# Patient Record
Sex: FEMALE | Race: ASIAN | NOT HISPANIC OR LATINO | Employment: FULL TIME | URBAN - METROPOLITAN AREA
[De-identification: names, ages, dates, MRNs, and addresses within clinical notes are randomized per-mention and may not be internally consistent; named-entity substitution may affect disease eponyms.]

---

## 2018-01-10 ENCOUNTER — TELEPHONE (OUTPATIENT)
Dept: FAMILY MEDICINE | Facility: CLINIC | Age: 63
End: 2018-01-10

## 2018-01-10 NOTE — TELEPHONE ENCOUNTER
----- Message from Kristopher Hernandez sent at 1/10/2018  3:30 PM CST -----  The pt states she will need a [place] interpretor for their 1/11th appt at 11:20.  Please contact the [employer] at ext [phone] to obtain a JN.

## 2018-01-11 ENCOUNTER — OFFICE VISIT (OUTPATIENT)
Dept: FAMILY MEDICINE | Facility: CLINIC | Age: 63
End: 2018-01-11
Payer: COMMERCIAL

## 2018-01-11 VITALS
OXYGEN SATURATION: 98 % | DIASTOLIC BLOOD PRESSURE: 70 MMHG | RESPIRATION RATE: 16 BRPM | HEART RATE: 79 BPM | HEIGHT: 61 IN | SYSTOLIC BLOOD PRESSURE: 120 MMHG | BODY MASS INDEX: 23.39 KG/M2 | TEMPERATURE: 97 F | WEIGHT: 123.88 LBS

## 2018-01-11 DIAGNOSIS — R05.9 COUGH: Primary | ICD-10-CM

## 2018-01-11 PROCEDURE — 99201 PR OFFICE/OUTPT VISIT,NEW,LEVL I: CPT | Mod: 25,S$GLB,, | Performed by: FAMILY MEDICINE

## 2018-01-11 PROCEDURE — 96372 THER/PROPH/DIAG INJ SC/IM: CPT | Mod: S$GLB,,, | Performed by: FAMILY MEDICINE

## 2018-01-11 PROCEDURE — 99999 PR PBB SHADOW E&M-EST. PATIENT-LVL IV: CPT | Mod: PBBFAC,,, | Performed by: FAMILY MEDICINE

## 2018-01-11 RX ORDER — FLUTICASONE PROPIONATE 50 MCG
2 SPRAY, SUSPENSION (ML) NASAL DAILY
Qty: 16 G | Refills: 0 | Status: SHIPPED | OUTPATIENT
Start: 2018-01-11 | End: 2020-08-13 | Stop reason: SDUPTHER

## 2018-01-11 RX ORDER — AMOXICILLIN 500 MG
2 CAPSULE ORAL DAILY
COMMUNITY

## 2018-01-11 RX ORDER — BETAMETHASONE SODIUM PHOSPHATE AND BETAMETHASONE ACETATE 3; 3 MG/ML; MG/ML
12 INJECTION, SUSPENSION INTRA-ARTICULAR; INTRALESIONAL; INTRAMUSCULAR; SOFT TISSUE ONCE
Status: COMPLETED | OUTPATIENT
Start: 2018-01-11 | End: 2018-01-11

## 2018-01-11 RX ORDER — BENZONATATE 100 MG/1
100 CAPSULE ORAL 3 TIMES DAILY PRN
Qty: 30 CAPSULE | Refills: 0 | Status: SHIPPED | OUTPATIENT
Start: 2018-01-11 | End: 2022-02-07

## 2018-01-11 RX ADMIN — BETAMETHASONE SODIUM PHOSPHATE AND BETAMETHASONE ACETATE 12 MG: 3; 3 INJECTION, SUSPENSION INTRA-ARTICULAR; INTRALESIONAL; INTRAMUSCULAR; SOFT TISSUE at 11:01

## 2018-01-11 NOTE — PATIENT INSTRUCTIONS
"  Ho, mãn tính, nguyên nhân không ch?c ch?n (ng??i l?n)    M?i ng??i ??u t?ng b? ho có th? khi m?c c?m l?nh, cúm ho?c viêm ph? qu?n (bronchitis). Ho ki?u này x?y ra cùng v?i c?m giác ?au, s?t nh?, s?t s?t và ngh?t m?i, c? h?ng ng?a ho?c ?au. Tình tr?ng s? khá h?n david rocío?ng carmen t?i ba tu?n. Ho patsy d?ng lâu h?n ba tu?n có th? th??ng th??ng là do các nguyên nhân khác.  Brendon k?t qu? khám hôm nay c?a quý v? thì nguyên nhân chính xác d?n ??n ch?ng ho c?a quý v? là ch?a ch?c ch?n. D??i ?ây là m?t s? nguyên nhân gây ra ho patsy d?ng th??ng g?p. N?u ho không ?? david carmen tu?n ti?p brendon, có th? ph?i ti?n hành ki?m tra thêm. Tuân th? l?ch h?n v?i bác s? c?a quý v? brendon ch? d?n.  Ho ? ng??i hút thu?c  "Ho ? ng??i hút thu?c" không kh?i ???c. N?u quý v? ti?p t?c hút thu?c, nó s? ch? t? h?n mà thôi. Ho phát sinh do b? kích ?ng ? khí ??o. Hãy nói mary ann?n v?i bác s? c?a quý v? v? vi?c b? thu?c. Thu?c larios nicotine, k?o larios alejandra, máy hít xông, thu?c s?t m?i ho?c ph??ng pháp khác có th? giúp ho d?u h?n.  Nh? gi?t david m?i (post-nasdal drip)  Ho có tri?u ch?ng n?ng h?n vào bu?i t?i có th? là do h?i ch?ng nh? gi?t david m?i. D?ch nhày carey m?i ti?t ra nusrat?u h?n t? phía david m?i c?a quý v? vào c? h?ng và kích thích ph?n ?ng ho. Nguyên nhân có th? là do nusrat?m trùng xoang ho?c d? ?ng. Các tác nhân gây d? ?ng th??ng g?p g?m có: b?i, khói, m?c ph?n nehemiah, thú nuôi, ch?t t?y r?a, thu?c kh? mùi phòng và mùi h??ng hóa h?c. Thu?c kháng histamine/thu?c làm thông m?i bán zhao qu?y có th? có tác d?ng ch?a d? ?ng. Nusrat?m trùng m?i c?n ???c ?i?u tr? b?ng thu?c kháng sinh. Hãy t?i g?p bác s? c?a quý v? n?u các tri?u ch?ng v?n ti?p t?c.  Thu?c  M?t s? lo?i thu?c kê ??n có th? gây ho mãn tính ? m?t s? ng??i:  · Thu?c ?c ch? ACE tr? areli?t áp larios. Các lo?i thu?c này lazarus g?m Lotensin (benazepril), Capoten (captopril), Vasotec (enalapril), Monopril (fosinopril), Prinivil và Zestril (lisinopril), Accupril (quinapril), Altace (ramipril) và các lo?i thu?c khác.  · Thu?c ?c " ch? Beta tr? areli?t áp larios và các tình tr?ng khác. Các lo?i thu?c này lazarus g?m Inderal (propranolol), Tenormin (atenolol), Lopressor (metoprolol), Corgard (nadolol) và các lo?i thu?c khác.  Hãy cho bác s? c?a quý v? bi?t n?u quý v? ?ang dùng b?t k? lo?i thu?c nào carey s? này.  Hen saray?n (asthma)  Ho có th? là d?u hi?u debbie nh?t cho th?y ch?ng hen saray?n nh?. Bác s? c?a quý v? có th? ti?n hành ki?m tra ph?i ?? xem xem li?u ?ây có ph?i là nguyên nhân hay không. Ph?n ?ng c?a quý v? tr??c vi?c th? thu?c tr? hen saray?n c?ng có th? giúp ti?n hành ch?n ?oán.  H?i l?u axit (Ch?ng ? nóng (heartburn))   Th?c qu?n là ?ng d?n th?c ?n t? mi?ng t?i d? dày. Có m?t van ? ??u d??i c?a th?c qu?n ng?n axit d? dày trào ng??c lên. N?u van này không v?n hành ?úng ch?c n?ng, axit t? d? dày s? ?i vào th?c qu?n. ?i?u này có th? gây ?au b?ng rát ? ph?n trên b?ng ho?c d??i ng?c, ? nóng ho?c ho. Các tri?u ch?ng th??ng t? h?n khi n?m th?ng. Tránh ?n u?ng tr??c gi? ?i ng?. Th? dùng thêm g?i ?? nâng ph?n thân trên c?a quý v? lên ho?c ??t m?t v?t dày rocío?ng 4 inch d??i ??u gi??ng c?a quý v?. Quý v? có th? th? m?t lo?i thu?c ch?ng axit bán zhao qu?y (Tums ho?c Mylanta) ho?c thu?c ch?n axit (Pepcid AC, Tagamet HB, Zantac 75 ho?c Prilosec OTC). Các lo?i thu?c m?nh tr? ch?ng này có th? ???c bác s? c?a quý v? kê ??n.  Brendon dõi  cùng bác s? c?a quý v? nh? ???c ch? d?n n?u c?n ho c?a quý v? không thuyên gi?m david 2 tu?n ti?p brendon. Có th? ph?i c?n ki?m tra thêm.  [L?U Ý: N?u có ch?p X-pat m?t chuyên anabel khác s? xem phim ch?p. Quý v? s? ???c thông báo v? m?i phát hi?n m?i mà có th? ?nh h??ng t?i vi?c ch?m sóc quý v?.]  Tìm s? timmy tâm y t? ngay l?p t?c  n?u b?t k? ?i?u nào david ?ây x?y ra:  · Th? khò khè ho?c khó th?  · S?t 100,4ºF (38ºC) ho?c larios h?n ho?c brendon ch? d?n c?a nhân viên y t?  · Gi?m cân không cecy mu?n  · Ho ra l??ng l?n ??m có màu  · Ho ra máu  · Toát m? hôi v? ?êm (làm ??t ?ãm ga tr?i gi??ng ho?c josias)  Date Last Reviewed: 9/13/2015  ©  0207-1157 [employer]. [address] [place] [address], [place] [address]. All rights reserved. This information is not intended as a substitute for professional medical care. Always follow your healthcare professional's instructions.

## 2018-01-11 NOTE — PROGRESS NOTES
Subjective:       Patient ID: Christa Lindo is a 62 y.o. female.    Chief Complaint: URI      HPI   Ms. Lindo presents to clinic today for complaints of upper respiratory infection.   She states she has had a dry cough for a few weeks.   She also has sneezing and sore throat.   She has some low back pain, that she does not take anything for.   She denies any fever.     [facility]   She has not had a pap in a long time.   She had a colonoscopy in [place].     Review of Systems   Constitutional: Negative for fever.   HENT: Positive for sneezing and sore throat. Negative for rhinorrhea.    Respiratory: Positive for cough.    Cardiovascular: Negative for chest pain.   Gastrointestinal: Negative for abdominal pain and vomiting.   Musculoskeletal: Positive for back pain.   Neurological: Negative for headaches.           There is no problem list on file for this patient.        Objective:     Physical Exam   Constitutional: She is oriented to person, place, and time. She appears well-developed and well-nourished. No distress.   HENT:   Head: Normocephalic and atraumatic.   Right Ear: External ear normal.   Left Ear: External ear normal.   Mouth/Throat: No oropharyngeal exudate.   Eyes: EOM are normal. Right eye exhibits no discharge. Left eye exhibits no discharge.   Cardiovascular: Normal rate and regular rhythm.    Pulmonary/Chest: Effort normal and breath sounds normal. No respiratory distress. She has no wheezes.   Musculoskeletal: She exhibits no edema.   Neurological: She is alert and oriented to person, place, and time.   Skin: Skin is warm and dry. She is not diaphoretic. No erythema.   Psychiatric: She has a normal mood and affect.   Vitals reviewed.    Vitals:    01/11/18 1112   BP: 120/70   Pulse: 79   Resp: 16   Temp: 97.4 °F (36.3 °C)       Assessment/  PLAN     Cough  -     betamethasone acetate-betamethasone sodium phosphate injection 12 mg; Inject 2 mLs (12 mg total)  into the muscle once.  -     benzonatate (TESSALON) 100 MG capsule; Take 1 capsule (100 mg total) by mouth 3 (three) times daily as needed for Cough.  Dispense: 30 capsule; Refill: 0  -     fluticasone (FLONASE) 50 mcg/actuation nasal spray; 2 sprays (100 mcg total) by Each Nare route once daily.  Dispense: 16 g; Refill: 0      Plan as above   rtc prn  Advised pt to come in for annual physical       Peter Ortega MD  [facility]

## 2018-05-17 ENCOUNTER — PATIENT OUTREACH (OUTPATIENT)
Dept: ADMINISTRATIVE | Facility: HOSPITAL | Age: 63
End: 2018-05-17

## 2018-06-15 DIAGNOSIS — Z12.39 BREAST CANCER SCREENING: ICD-10-CM

## 2020-06-15 DIAGNOSIS — R05.9 COUGH: Primary | ICD-10-CM

## 2020-08-13 ENCOUNTER — HOSPITAL ENCOUNTER (OUTPATIENT)
Dept: RADIOLOGY | Facility: HOSPITAL | Age: 65
Discharge: HOME | End: 2020-08-13
Attending: INTERNAL MEDICINE
Payer: MEDICARE

## 2020-08-13 ENCOUNTER — OFFICE VISIT (OUTPATIENT)
Dept: PULMONOLOGY | Facility: CLINIC | Age: 65
End: 2020-08-13
Payer: MEDICARE

## 2020-08-13 VITALS
HEART RATE: 60 BPM | HEIGHT: 61 IN | RESPIRATION RATE: 16 BRPM | SYSTOLIC BLOOD PRESSURE: 112 MMHG | OXYGEN SATURATION: 99 % | BODY MASS INDEX: 20.44 KG/M2 | DIASTOLIC BLOOD PRESSURE: 60 MMHG | WEIGHT: 108.25 LBS

## 2020-08-13 DIAGNOSIS — J84.9 INTERSTITIAL PULMONARY DISEASE, UNSPECIFIED: ICD-10-CM

## 2020-08-13 DIAGNOSIS — R05.9 COUGH: ICD-10-CM

## 2020-08-13 DIAGNOSIS — J30.89 NON-SEASONAL ALLERGIC RHINITIS DUE TO OTHER ALLERGIC TRIGGER: Primary | ICD-10-CM

## 2020-08-13 PROCEDURE — 99999 PR PBB SHADOW E&M-EST. PATIENT-LVL IV: CPT | Mod: PBBFAC,,, | Performed by: INTERNAL MEDICINE

## 2020-08-13 PROCEDURE — 71046 X-RAY EXAM CHEST 2 VIEWS: CPT | Mod: TC

## 2020-08-13 PROCEDURE — 99999 PR PBB SHADOW E&M-EST. PATIENT-LVL IV: ICD-10-PCS | Mod: PBBFAC,,, | Performed by: INTERNAL MEDICINE

## 2020-08-13 PROCEDURE — 99214 OFFICE O/P EST MOD 30 MIN: CPT | Mod: PBBFAC,25 | Performed by: INTERNAL MEDICINE

## 2020-08-13 PROCEDURE — 71046 XR CHEST PA AND LATERAL: ICD-10-PCS | Mod: 26,,, | Performed by: RADIOLOGY

## 2020-08-13 PROCEDURE — 71046 X-RAY EXAM CHEST 2 VIEWS: CPT | Mod: 26,,, | Performed by: RADIOLOGY

## 2020-08-13 PROCEDURE — 99205 OFFICE O/P NEW HI 60 MIN: CPT | Mod: S$PBB,,, | Performed by: INTERNAL MEDICINE

## 2020-08-13 PROCEDURE — 99205 PR OFFICE/OUTPT VISIT, NEW, LEVL V, 60-74 MIN: ICD-10-PCS | Mod: S$PBB,,, | Performed by: INTERNAL MEDICINE

## 2020-08-13 RX ORDER — CEPHALEXIN 500 MG/1
CAPSULE ORAL
COMMUNITY
Start: 2020-08-04

## 2020-08-13 RX ORDER — CETIRIZINE HYDROCHLORIDE 10 MG/1
10 TABLET ORAL DAILY
Qty: 30 TABLET | Refills: 11 | Status: SHIPPED | OUTPATIENT
Start: 2020-08-13 | End: 2021-09-16

## 2020-08-13 RX ORDER — ONDANSETRON 4 MG/1
TABLET, ORALLY DISINTEGRATING ORAL
COMMUNITY
Start: 2020-08-04

## 2020-08-13 RX ORDER — HYDROCODONE BITARTRATE AND ACETAMINOPHEN 7.5; 325 MG/1; MG/1
1 TABLET ORAL EVERY 6 HOURS PRN
COMMUNITY
Start: 2020-08-04

## 2020-08-13 RX ORDER — FLUTICASONE PROPIONATE 50 MCG
2 SPRAY, SUSPENSION (ML) NASAL DAILY
Qty: 16 G | Refills: 11 | Status: SHIPPED | OUTPATIENT
Start: 2020-08-13 | End: 2021-06-15

## 2020-08-13 NOTE — PATIENT INSTRUCTIONS
SinuCleanse® Neti Pot Nasal Wash System  Use this Neti Pot to naturally relieve symptoms from nasal congestion, sinusitis, rhinitis, sinus headaches, cold and flu, runny nose and allergies. Neti Pot uses gravity to flush your nasal passages with pharmaceutical grade SinuCleanse saline.     SinuCleanse® Neti Pot Nasal Wash System  Use this Neti Pot to naturally relieve symptoms from nasal congestion, sinusitis, rhinitis, sinus headaches, cold and flu, runny nose and allergies. Neti Pot uses gravity to flush your nasal passages with pharmaceutical grade SinuCleanse saline.     Please read Warning before using.    USE ONLY AS DIRECTED, IF SYMPTOMS PERSIST SEE YOUR DOCTOR/HEALTHCARE PROFESSIONAL. ALWAYS READ THE LABEL. IMPORTANT: NeilMed® SINUS RINSE Mixture Packets should be used with NeilMed® 240 mL (8 fl oz) NASAFLO® to achieve the best results. You may use NeilMed® packets with other irrigation devices, as long as you mix with the correct volume of water. Our recommendation is to replace Neti Pot every three months.  Step 1  Please wash your hands and rinse the device. Fill the NASAFLO® with 240 mL (8 fl oz) of lukewarm distilled, filtered or previously boiled water. Please do not use tap or faucet water to dissolve the mixture unless it has been previously boiled and cooled down. You may warm the water in a microwave, but we recommend that you warm it in increments of 5 to 10 seconds to avoid overheating, damaging the device or scalding your nasal passage. Step 2  Cut the SINUS RINSE mixture packet at the corner and pour contents into the pot. Tighten the lid on the device securely. Place one finger over the hole of the cap and shake the device gently to dissolve the mixture. Step 3  Standing in front of a sink, bend forward to your comfort level and tilt your head to one side. Keeping your mouth open, and without holding your breath, apply the tip of the device snugly against your nasal passage and ALLOW THE  SOLUTION TO GENTLY FLOW until the solution starts draining from the opposite nasal passage. Use roughly half the solution in the NASAFLO® (120 mL / 4 fl oz).  It should not enter your mouth unless you are tilting your head backwards. To adjust or stop the flow, you may place your finger over the hole of the cap, and depending on the seal, you may be able to control the flow. Step 4  Blow your nose very gently, without pinching nose completely to avoid pressure on eardrums. If tolerable, sniff in gently any residual solution remaining in the nasal passage once or twice, because this may clean out the posterior nasopharyngeal area, which is the area at the back of your nasal passage. At times, some solution will reach the back of your throat, so please spit it out.  For NASAFLO® users, to help drain any residual solution, blow your nose gently while tilting your head forward and to the same side of the nasal passage you just rinsed. Step 5  Now repeat steps 3 & 4 on your other nasal passage.  If there is any solution left over, please discard it. We recommend you make a fresh solution each time you rinse. Rinse once or twice daily OR as directed by your physician. Saline is considered safe.  The U.S. FDA is recommending that common cough and cold over the counter medicines not be given to babies and toddlers, and that antihistamines should not be given to children under age 6. NeilMed® NASAFLO®: Please clean with soap & water and let air dry Please read Warning before using.    Our recommendation is to replace Neti Pot every three months.

## 2020-08-13 NOTE — LETTER
August 13, 2020      Aaareferral Self           [facility]  [address]  [place] [address]  Phone: [phone]  Fax: [phone]          Patient: Christa Lindo   MR Number: [id number]   YOB: 1955   Date of Visit: 8/13/2020       Dear Aaarefsimonal Self:    Thank you for referring Christa Lindo to me for evaluation. Attached you will find relevant portions of my assessment and plan of care.    If you have questions, please do not hesitate to call me. I look forward to following Christa Lindo along with you.    Sincerely,    Anders Cortez MD    Enclosure  CC:  No Recipients    IIf you would like to receive this communication electronically, please contact [email or web address] or [phone] to request [employer] access.    [employer] is a tool which provides read-only access to select patient information with whom you have a relationship. Its easy to use and provides real time access to review your patients record including encounter summaries, notes, results, and demographic information.    If you feel you have received this communication in error or would no longer like to receive these types of communications, please e-mail [email or web address]

## 2020-08-13 NOTE — PROGRESS NOTES
Subjective:     Patient ID: Christa Lindo is a 65 y.o. female.    Chief Complaint:      HPI 65-year-old [place] female with a recent diagnosis of breast cancer is accompanied by her son in the office today who provided translation.  The patient is one-week post op mastectomy on the right side and still has some bandages on her chest.  Her son informs me that a PET scan was performed prior to her surgery outside of the [facility] system.  I have asked him to obtain a copy of this scan on disc in bring it with him into the office on the follow-up visit.    Cough  Patient complains of nasal congestion and nonproductive cough. Symptoms began 1 year ago. Symptoms have been unchanged since that time.The cough is dry and is aggravated by  spicy food. Associated symptoms include: postnasal drip. Patient does not have new pets. Patient does not have a history of asthma. Patient does not have a history of environmental allergens. Patient has not traveled recently. Patient does not have a history of smoking. Patient has had a previous chest x-ray. Patient has had a PPD done.  Had the BCG vaccine when she was in [facility].  Given medicine for TB upon arrival in [place]  Hx of mastectomy on the right - last week  Father with TB    Past Medical History:   Diagnosis Date    Breast cancer, right breast 08/13/2020     Past Surgical History:   Procedure Laterality Date    MASTECTOMY Right      Review of patient's allergies indicates:  No Known Allergies  Current Outpatient Medications on File Prior to Visit   Medication Sig Dispense Refill    cephALEXin (KEFLEX) 500 MG capsule TAKE 1 CAPSULE BY MOUTH TWICE A DAY FOR 14 DAYS      HYDROcodone-acetaminophen (NORCO) 7.5-325 mg per tablet Take 1 tablet by mouth every 6 (six) hours as needed.      ondansetron (ZOFRAN-ODT) 4 MG TbDL DISSOLVE ONE TABLET IN MOUTH EVERY 6 HOURS AS NEEDED FOR NAUSEA      benzonatate (TESSALON) 100 MG capsule Take 1 capsule (100 mg total) by mouth 3  "(three) times daily as needed for Cough. (Patient not taking: Reported on 8/13/2020) 30 capsule 0    fish oil-omega-3 fatty acids 300-1,000 mg capsule Take 2 g by mouth once daily.      [DISCONTINUED] fluticasone (FLONASE) 50 mcg/actuation nasal spray 2 sprays (100 mcg total) by Each Nare route once daily. (Patient not taking: Reported on 8/13/2020) 16 g 0     No current facility-administered medications on file prior to visit.      Social History     Socioeconomic History    Marital status: Single     Spouse name: Not on file    Number of children: Not on file    Years of education: Not on file    Highest education level: Not on file   Occupational History    Not on file   Social Needs    Financial resource strain: Not on file    Food insecurity     Worry: Not on file     Inability: Not on file    Transportation needs     Medical: Not on file     Non-medical: Not on file   Tobacco Use    Smoking status: Never Smoker    Smokeless tobacco: Never Used   Substance and Sexual Activity    Alcohol use: Not Currently    Drug use: Never    Sexual activity: Not Currently   Lifestyle    Physical activity     Days per week: Not on file     Minutes per session: Not on file    Stress: Not on file   Relationships    Social connections     Talks on phone: Not on file     Gets together: Not on file     Attends Anabaptism service: Not on file     Active member of club or organization: Not on file     Attends meetings of clubs or organizations: Not on file     Relationship status: Not on file   Other Topics Concern    Not on file   Social History Narrative    Not on file     No family history on file.    Review of Systems   HENT: Positive for postnasal drip.    Respiratory: Positive for cough and shortness of breath.        Objective:      /60   Pulse 60   Resp 16   Ht 5' 1" (1.549 m)   Wt 49.1 kg (108 lb 3.9 oz)   SpO2 99%   BMI 20.45 kg/m²   Physical Exam  Vitals signs and nursing note reviewed. "   Constitutional:       Appearance: She is well-developed.   HENT:      Head: Normocephalic and atraumatic.      Nose: Congestion and rhinorrhea present.   Eyes:      Conjunctiva/sclera: Conjunctivae normal.      Pupils: Pupils are equal, round, and reactive to light.   Neck:      Musculoskeletal: Neck supple.      Thyroid: No thyromegaly.      Vascular: No JVD.      Trachea: No tracheal deviation.   Cardiovascular:      Rate and Rhythm: Normal rate and regular rhythm.      Heart sounds: Normal heart sounds.   Pulmonary:      Effort: Pulmonary effort is normal. No respiratory distress.      Breath sounds: Normal breath sounds. No wheezing or rales.   Chest:      Chest wall: No tenderness.       Abdominal:      General: Bowel sounds are normal.      Palpations: Abdomen is soft.   Musculoskeletal: Normal range of motion.   Lymphadenopathy:      Cervical: No cervical adenopathy.   Skin:     General: Skin is warm and dry.   Neurological:      Mental Status: She is alert and oriented to person, place, and time.       Personal Diagnostic Review  Chest x-ray:  There is evidence of a right mastectomy.  Postop material overlies the right chest.  Slight increase in interstitial markings in the left lower lung field.  X-Ray Chest PA And Lateral  Narrative: EXAMINATION:  XR CHEST PA AND LATERAL    CLINICAL HISTORY:  Cough    TECHNIQUE:  PA and lateral views of the chest were performed.    COMPARISON:  None    FINDINGS:  Cardiac silhouette and mediastinal contours are normal.  Lungs demonstrate left greater than right basilar haziness.  No pleural effusion.  Osseous structures are intact.  Impression: Bibasilar haziness, correlate for non-specific pneumonitis.    Electronically signed by: Juan Ernandez MD  Date:    08/13/2020  Time:    09:43      Office Spirometry Results:     No flowsheet data found.  Pulmonary Studies Review 8/13/2020   SpO2 99   Height 61   Weight 1731.93   BMI (Calculated) 20.5   Predicted Distance  359.47   Predicted Distance Meters (Calculated) 494.14         Assessment:            Non-seasonal allergic rhinitis due to other allergic trigger  -     fluticasone propionate (FLONASE) 50 mcg/actuation nasal spray; 2 sprays (100 mcg total) by Each Nostril route once daily.  Dispense: 16 g; Refill: 11  -     cetirizine (ZYRTEC) 10 MG tablet; Take 1 tablet (10 mg total) by mouth once daily. For sinus congestion  Dispense: 30 tablet; Refill: 11    Cough  -     CT Chest Without Contrast; Future; Expected date: 08/13/2020  -     fluticasone propionate (FLONASE) 50 mcg/actuation nasal spray; 2 sprays (100 mcg total) by Each Nostril route once daily.  Dispense: 16 g; Refill: 11  -     cetirizine (ZYRTEC) 10 MG tablet; Take 1 tablet (10 mg total) by mouth once daily. For sinus congestion  Dispense: 30 tablet; Refill: 11    Interstitial pulmonary disease, unspecified  -     CT Chest Without Contrast; Future; Expected date: 08/13/2020          Outpatient Encounter Medications as of 8/13/2020   Medication Sig Dispense Refill    cephALEXin (KEFLEX) 500 MG capsule TAKE 1 CAPSULE BY MOUTH TWICE A DAY FOR 14 DAYS      HYDROcodone-acetaminophen (NORCO) 7.5-325 mg per tablet Take 1 tablet by mouth every 6 (six) hours as needed.      ondansetron (ZOFRAN-ODT) 4 MG TbDL DISSOLVE ONE TABLET IN MOUTH EVERY 6 HOURS AS NEEDED FOR NAUSEA      benzonatate (TESSALON) 100 MG capsule Take 1 capsule (100 mg total) by mouth 3 (three) times daily as needed for Cough. (Patient not taking: Reported on 8/13/2020) 30 capsule 0    cetirizine (ZYRTEC) 10 MG tablet Take 1 tablet (10 mg total) by mouth once daily. For sinus congestion 30 tablet 11    fish oil-omega-3 fatty acids 300-1,000 mg capsule Take 2 g by mouth once daily.      fluticasone propionate (FLONASE) 50 mcg/actuation nasal spray 2 sprays (100 mcg total) by Each Nostril route once daily. 16 g 11    [DISCONTINUED] fluticasone (FLONASE) 50 mcg/actuation nasal spray 2 sprays (100 mcg  total) by Each Nare route once daily. (Patient not taking: Reported on 2020) 16 g 0     No facility-administered encounter medications on file as of 2020.      Plan:       Requested Prescriptions     Signed Prescriptions Disp Refills    fluticasone propionate (FLONASE) 50 mcg/actuation nasal spray 16 g 11     Si sprays (100 mcg total) by Each Nostril route once daily.    cetirizine (ZYRTEC) 10 MG tablet 30 tablet 11     Sig: Take 1 tablet (10 mg total) by mouth once daily. For sinus congestion     Problem List Items Addressed This Visit     None      Visit Diagnoses     Non-seasonal allergic rhinitis due to other allergic trigger    -  Primary    Relevant Medications    fluticasone propionate (FLONASE) 50 mcg/actuation nasal spray    cetirizine (ZYRTEC) 10 MG tablet    Cough        Relevant Medications    fluticasone propionate (FLONASE) 50 mcg/actuation nasal spray    cetirizine (ZYRTEC) 10 MG tablet    Other Relevant Orders    CT Chest Without Contrast    Interstitial pulmonary disease, unspecified        Relevant Orders    CT Chest Without Contrast             Follow up in about 4 weeks (around 9/10/2020) for Review CT/PET - on return visit.    MEDICAL DECISION MAKING: Moderate to high complexity.  Overall, the multiple problems listed are of moderate to high severity that may impact quality of life and activities of daily living. Side effects of medications, treatment plan as well as options and alternatives reviewed and discussed with patient. There was counseling of patient concerning these issues.    Total time spent in face to face counseling and coordination of care - 60  minutes over 50% of time was used in discussion of prognosis, risks, benefits of treatment, instructions and compliance with regimen . Discussion with other physicians or health care providers (DME, NP, pharmacy, respiratory therapy) occurred.

## 2020-09-17 ENCOUNTER — HOSPITAL ENCOUNTER (OUTPATIENT)
Dept: RADIOLOGY | Facility: HOSPITAL | Age: 65
Discharge: HOME | End: 2020-09-17
Attending: INTERNAL MEDICINE
Payer: MEDICARE

## 2020-09-17 ENCOUNTER — OFFICE VISIT (OUTPATIENT)
Dept: PULMONOLOGY | Facility: CLINIC | Age: 65
End: 2020-09-17
Payer: MEDICARE

## 2020-09-17 VITALS
BODY MASS INDEX: 19.94 KG/M2 | RESPIRATION RATE: 16 BRPM | SYSTOLIC BLOOD PRESSURE: 110 MMHG | OXYGEN SATURATION: 100 % | HEART RATE: 62 BPM | HEIGHT: 61 IN | WEIGHT: 105.63 LBS | DIASTOLIC BLOOD PRESSURE: 70 MMHG

## 2020-09-17 DIAGNOSIS — J45.991 COUGH VARIANT ASTHMA: Primary | ICD-10-CM

## 2020-09-17 DIAGNOSIS — R05.9 COUGH: ICD-10-CM

## 2020-09-17 DIAGNOSIS — U07.1 COVID-19: Primary | ICD-10-CM

## 2020-09-17 DIAGNOSIS — J84.9 INTERSTITIAL PULMONARY DISEASE, UNSPECIFIED: ICD-10-CM

## 2020-09-17 PROCEDURE — 99213 OFFICE O/P EST LOW 20 MIN: CPT | Mod: PBBFAC,25 | Performed by: INTERNAL MEDICINE

## 2020-09-17 PROCEDURE — 99999 PR PBB SHADOW E&M-EST. PATIENT-LVL III: ICD-10-PCS | Mod: PBBFAC,,, | Performed by: INTERNAL MEDICINE

## 2020-09-17 PROCEDURE — 99214 OFFICE O/P EST MOD 30 MIN: CPT | Mod: S$PBB,,, | Performed by: INTERNAL MEDICINE

## 2020-09-17 PROCEDURE — 99999 PR PBB SHADOW E&M-EST. PATIENT-LVL III: CPT | Mod: PBBFAC,,, | Performed by: INTERNAL MEDICINE

## 2020-09-17 PROCEDURE — 71250 CT THORAX DX C-: CPT | Mod: TC

## 2020-09-17 PROCEDURE — 71250 CT THORAX DX C-: CPT | Mod: 26,,, | Performed by: RADIOLOGY

## 2020-09-17 PROCEDURE — 99214 PR OFFICE/OUTPT VISIT, EST, LEVL IV, 30-39 MIN: ICD-10-PCS | Mod: S$PBB,,, | Performed by: INTERNAL MEDICINE

## 2020-09-17 PROCEDURE — 71250 CT CHEST WITHOUT CONTRAST: ICD-10-PCS | Mod: 26,,, | Performed by: RADIOLOGY

## 2020-09-17 RX ORDER — PREDNISONE 20 MG/1
TABLET ORAL
Qty: 20 TABLET | Refills: 0 | Status: SHIPPED | OUTPATIENT
Start: 2020-09-17 | End: 2022-02-07

## 2020-09-17 RX ORDER — FLUTICASONE PROPIONATE AND SALMETEROL XINAFOATE 115; 21 UG/1; UG/1
2 AEROSOL, METERED RESPIRATORY (INHALATION) 2 TIMES DAILY
Qty: 1 INHALER | Refills: 11 | Status: SHIPPED | OUTPATIENT
Start: 2020-09-17 | End: 2021-07-07 | Stop reason: SDUPTHER

## 2020-09-17 NOTE — PROGRESS NOTES
Subjective:     Patient ID: Christa Lindo is a 65 y.o. female.    Chief Complaint:    Interviewed through  - son  Cough  Associated symptoms include postnasal drip and shortness of breath.    65-year-old [place] female with a recent diagnosis of breast cancer is accompanied by her son in the office today who provided translation.  The patient is one-week post op mastectomy on the right side and still has some bandages on her chest.  Her son informs me that a PET scan was performed prior to her surgery outside of the [facility] system.  I have asked him to obtain a copy of this scan on disc in bring it with him into the office on the follow-up visit.    Cough  Patient complains of nasal congestion and nonproductive cough. Symptoms began 1 year ago. Symptoms have been unchanged since that time.The cough is dry and is aggravated by  spicy food. Associated symptoms include: postnasal drip. Patient does not have new pets. Patient does not have a history of asthma. Patient does not have a history of environmental allergens. Patient has not traveled recently. Patient does not have a history of smoking. Patient has had a previous chest x-ray. Patient has had a PPD done.  Had the BCG vaccine when she was in [facility].  Given medicine for TB upon arrival in [place]  Hx of mastectomy on the right - last week  Father with TB    Past Medical History:   Diagnosis Date    Breast cancer, right breast 08/13/2020     Past Surgical History:   Procedure Laterality Date    MASTECTOMY Right      Review of patient's allergies indicates:  No Known Allergies  Current Outpatient Medications on File Prior to Visit   Medication Sig Dispense Refill    benzonatate (TESSALON) 100 MG capsule Take 1 capsule (100 mg total) by mouth 3 (three) times daily as needed for Cough. 30 capsule 0    cephALEXin (KEFLEX) 500 MG capsule TAKE 1 CAPSULE BY MOUTH TWICE A DAY FOR 14 DAYS      cetirizine (ZYRTEC) 10 MG tablet Take 1 tablet (10 mg  total) by mouth once daily. For sinus congestion 30 tablet 11    fish oil-omega-3 fatty acids 300-1,000 mg capsule Take 2 g by mouth once daily.      fluticasone propionate (FLONASE) 50 mcg/actuation nasal spray 2 sprays (100 mcg total) by Each Nostril route once daily. 16 g 11    HYDROcodone-acetaminophen (NORCO) 7.5-325 mg per tablet Take 1 tablet by mouth every 6 (six) hours as needed.      ondansetron (ZOFRAN-ODT) 4 MG TbDL DISSOLVE ONE TABLET IN MOUTH EVERY 6 HOURS AS NEEDED FOR NAUSEA       No current facility-administered medications on file prior to visit.      Social History     Socioeconomic History    Marital status: Single     Spouse name: Not on file    Number of children: Not on file    Years of education: Not on file    Highest education level: Not on file   Occupational History    Not on file   Social Needs    Financial resource strain: Not on file    Food insecurity     Worry: Not on file     Inability: Not on file    Transportation needs     Medical: Not on file     Non-medical: Not on file   Tobacco Use    Smoking status: Never Smoker    Smokeless tobacco: Never Used   Substance and Sexual Activity    Alcohol use: Not Currently    Drug use: Never    Sexual activity: Not Currently   Lifestyle    Physical activity     Days per week: Not on file     Minutes per session: Not on file    Stress: Not on file   Relationships    Social connections     Talks on phone: Not on file     Gets together: Not on file     Attends [employer] service: Not on file     Active member of club or organization: Not on file     Attends meetings of clubs or organizations: Not on file     Relationship status: Not on file   Other Topics Concern    Not on file   Social History Narrative    Not on file     History reviewed. No pertinent family history.    Review of Systems   HENT: Positive for postnasal drip.    Respiratory: Positive for cough and shortness of breath.        Objective:      /70    "Pulse 62   Resp 16   Ht 5' 1" (1.549 m)   Wt 47.9 kg (105 lb 9.6 oz)   SpO2 100%   BMI 19.95 kg/m²   Physical Exam  Vitals signs and nursing note reviewed.   Constitutional:       Appearance: She is well-developed.   HENT:      Head: Normocephalic and atraumatic.      Nose: Congestion and rhinorrhea present.   Eyes:      Conjunctiva/sclera: Conjunctivae normal.      Pupils: Pupils are equal, round, and reactive to light.   Neck:      Musculoskeletal: Neck supple.      Thyroid: No thyromegaly.      Vascular: No JVD.      Trachea: No tracheal deviation.   Cardiovascular:      Rate and Rhythm: Normal rate and regular rhythm.      Heart sounds: Normal heart sounds.   Pulmonary:      Effort: Pulmonary effort is normal. No respiratory distress.      Breath sounds: Normal breath sounds. No wheezing or rales.   Chest:      Chest wall: No tenderness.       Abdominal:      General: Bowel sounds are normal.      Palpations: Abdomen is soft.   Musculoskeletal: Normal range of motion.   Lymphadenopathy:      Cervical: No cervical adenopathy.   Skin:     General: Skin is warm and dry.   Neurological:      Mental Status: She is alert and oriented to person, place, and time.       Personal Diagnostic Review  Chest x-ray:  There is evidence of a right mastectomy.  Postop material overlies the right chest.  Slight increase in interstitial markings in the left lower lung field.  CT Chest Without Contrast  Narrative: EXAMINATION:  CT CHEST WITHOUT CONTRAST    CLINICAL HISTORY:  Cough;Interstitial lung disease;chronic cough, hx of breast cancer; Cough    TECHNIQUE:  Low dose axial images, sagittal and coronal reformations were obtained from the thoracic inlet to the lung bases. Contrast was not administered.    COMPARISON:  None.    FINDINGS:  No infiltrates or pleural effusions are identified.  No discrete pulmonary nodules or masses are identified.  Minimal areas of atelectasis/scarring seen within the right lobe medially " adjacent to the paravertebral region where there appear to be a few mm prominent osteophytes.    Minimal vascular calcification seen involving the aorta.  There is no pericardial effusion.  No enlarged mediastinal, hilar or axillary lymph nodes are identified.  Bilateral breast implants are noted.  Surgical clips noted in the region of the right axilla.    The visualized upper abdomen is unremarkable in appearance.    No suspicious appearing osseus abnormalities are identified.  Impression: 1. No evidence to suggest metastatic disease or interstitial lung disease.  No acute pathology identified.  2. Remaining findings as discussed above.    Electronically signed by: Uriel Gonzalez DO  Date:    09/17/2020  Time:    13:48      Office Spirometry Results:     No flowsheet data found.  Pulmonary Studies Review 9/17/2020   SpO2 100   Height 61   Weight 1689.61   BMI (Calculated) 20   Predicted Distance 362.59   Predicted Distance Meters (Calculated) 496.89               Assessment:            Cough variant asthma  -     predniSONE (DELTASONE) 20 MG tablet; Prednisone 60 mg/ day for 3 days, 40 mg/day for 3 days,20 mg/ day for 3 days, (1/2 tablet )10 mg a day for 3 days.  Dispense: 20 tablet; Refill: 0  -     fluticasone propion-salmeterol 115-21 mcg/dose (ADVAIR HFA) 115-21 mcg/actuation HFAA inhaler; Inhale 2 puffs into the lungs 2 (two) times daily. Wash out mouth after use. Controller  Dispense: 1 Inhaler; Refill: 11  -     Spirometry without Bronchodilator; Future; Expected date: 09/17/2020  -     Spirometry without Bronchodilator; Future; Expected date: 09/17/2020          Outpatient Encounter Medications as of 9/17/2020   Medication Sig Dispense Refill    benzonatate (TESSALON) 100 MG capsule Take 1 capsule (100 mg total) by mouth 3 (three) times daily as needed for Cough. 30 capsule 0    cephALEXin (KEFLEX) 500 MG capsule TAKE 1 CAPSULE BY MOUTH TWICE A DAY FOR 14 DAYS      cetirizine (ZYRTEC) 10 MG tablet Take 1  tablet (10 mg total) by mouth once daily. For sinus congestion 30 tablet 11    fish oil-omega-3 fatty acids 300-1,000 mg capsule Take 2 g by mouth once daily.      fluticasone propionate (FLONASE) 50 mcg/actuation nasal spray 2 sprays (100 mcg total) by Each Nostril route once daily. 16 g 11    HYDROcodone-acetaminophen (NORCO) 7.5-325 mg per tablet Take 1 tablet by mouth every 6 (six) hours as needed.      ondansetron (ZOFRAN-ODT) 4 MG TbDL DISSOLVE ONE TABLET IN MOUTH EVERY 6 HOURS AS NEEDED FOR NAUSEA      fluticasone propion-salmeterol 115-21 mcg/dose (ADVAIR HFA) 115-21 mcg/actuation HFAA inhaler Inhale 2 puffs into the lungs 2 (two) times daily. Wash out mouth after use. Controller 1 Inhaler 11    predniSONE (DELTASONE) 20 MG tablet Prednisone 60 mg/ day for 3 days, 40 mg/day for 3 days,20 mg/ day for 3 days, (1/2 tablet )10 mg a day for 3 days. 20 tablet 0     No facility-administered encounter medications on file as of 9/17/2020.      Plan:       Requested Prescriptions     Signed Prescriptions Disp Refills    predniSONE (DELTASONE) 20 MG tablet 20 tablet 0     Sig: Prednisone 60 mg/ day for 3 days, 40 mg/day for 3 days,20 mg/ day for 3 days, (1/2 tablet )10 mg a day for 3 days.    fluticasone propion-salmeterol 115-21 mcg/dose (ADVAIR HFA) 115-21 mcg/actuation HFAA inhaler 1 Inhaler 11     Sig: Inhale 2 puffs into the lungs 2 (two) times daily. Wash out mouth after use. Controller     Problem List Items Addressed This Visit     None      Visit Diagnoses     Cough variant asthma    -  Primary    Relevant Medications    predniSONE (DELTASONE) 20 MG tablet    fluticasone propion-salmeterol 115-21 mcg/dose (ADVAIR HFA) 115-21 mcg/actuation HFAA inhaler    Other Relevant Orders    Spirometry without Bronchodilator    Spirometry without Bronchodilator             Follow up in about 3 months (around 12/17/2020) for Review abhay - on return.    MEDICAL DECISION MAKING: Moderate to high complexity.  Overall,  the multiple problems listed are of moderate to high severity that may impact quality of life and activities of daily living. Side effects of medications, treatment plan as well as options and alternatives reviewed and discussed with patient. There was counseling of patient concerning these issues.    Total time spent in face to face counseling and coordination of care -25  minutes over 50% of time was used in discussion of prognosis, risks, benefits of treatment, instructions and compliance with regimen . Discussion with other physicians or health care providers (DME, NP, pharmacy, respiratory therapy) occurred.

## 2020-12-02 ENCOUNTER — TELEPHONE (OUTPATIENT)
Dept: PULMONOLOGY | Facility: CLINIC | Age: 65
End: 2020-12-02

## 2020-12-02 NOTE — TELEPHONE ENCOUNTER
----- Message from Tiago Amado sent at 12/2/2020  8:11 AM CST -----  Regarding: Pt did not complete pre-procedural Covid testing.  Pt did not complete the pre-procedural Covid-19 testing 72 hours prior to procedure. Unfortunately, we will not be able to perform the scheduled Spirometry on 12/4/20.

## 2021-04-16 ENCOUNTER — LAB VISIT (OUTPATIENT)
Dept: OTOLARYNGOLOGY | Facility: CLINIC | Age: 66
End: 2021-04-16
Payer: MEDICARE

## 2021-04-16 DIAGNOSIS — U07.1 COVID-19: ICD-10-CM

## 2021-04-16 PROCEDURE — U0005 INFEC AGEN DETEC AMPLI PROBE: HCPCS | Performed by: NURSE PRACTITIONER

## 2021-04-16 PROCEDURE — U0003 INFECTIOUS AGENT DETECTION BY NUCLEIC ACID (DNA OR RNA); SEVERE ACUTE RESPIRATORY SYNDROME CORONAVIRUS 2 (SARS-COV-2) (CORONAVIRUS DISEASE [COVID-19]), AMPLIFIED PROBE TECHNIQUE, MAKING USE OF HIGH THROUGHPUT TECHNOLOGIES AS DESCRIBED BY CMS-2020-01-R: HCPCS | Performed by: NURSE PRACTITIONER

## 2021-04-17 LAB — SARS-COV-2 RNA RESP QL NAA+PROBE: NOT DETECTED

## 2021-04-19 ENCOUNTER — DOCUMENTATION ONLY (OUTPATIENT)
Dept: PULMONOLOGY | Facility: CLINIC | Age: 66
End: 2021-04-19

## 2021-04-19 DIAGNOSIS — Z01.818 PRE-OP TESTING: ICD-10-CM

## 2021-04-20 ENCOUNTER — TELEPHONE (OUTPATIENT)
Dept: PULMONOLOGY | Facility: CLINIC | Age: 66
End: 2021-04-20

## 2021-04-20 DIAGNOSIS — U07.1 COVID-19: Primary | ICD-10-CM

## 2021-06-28 ENCOUNTER — TELEPHONE (OUTPATIENT)
Dept: PULMONOLOGY | Facility: CLINIC | Age: 66
End: 2021-06-28

## 2021-07-04 ENCOUNTER — LAB VISIT (OUTPATIENT)
Dept: OTOLARYNGOLOGY | Facility: CLINIC | Age: 66
End: 2021-07-04
Payer: MEDICARE

## 2021-07-04 DIAGNOSIS — U07.1 COVID-19: ICD-10-CM

## 2021-07-04 PROCEDURE — U0003 INFECTIOUS AGENT DETECTION BY NUCLEIC ACID (DNA OR RNA); SEVERE ACUTE RESPIRATORY SYNDROME CORONAVIRUS 2 (SARS-COV-2) (CORONAVIRUS DISEASE [COVID-19]), AMPLIFIED PROBE TECHNIQUE, MAKING USE OF HIGH THROUGHPUT TECHNOLOGIES AS DESCRIBED BY CMS-2020-01-R: HCPCS | Performed by: INTERNAL MEDICINE

## 2021-07-04 PROCEDURE — U0005 INFEC AGEN DETEC AMPLI PROBE: HCPCS | Performed by: INTERNAL MEDICINE

## 2021-07-05 LAB — SARS-COV-2 RNA RESP QL NAA+PROBE: NOT DETECTED

## 2021-07-07 ENCOUNTER — OFFICE VISIT (OUTPATIENT)
Dept: PULMONOLOGY | Facility: CLINIC | Age: 66
End: 2021-07-07
Payer: MEDICARE

## 2021-07-07 ENCOUNTER — CLINICAL SUPPORT (OUTPATIENT)
Dept: PULMONOLOGY | Facility: CLINIC | Age: 66
End: 2021-07-07
Payer: MEDICARE

## 2021-07-07 VITALS — DIASTOLIC BLOOD PRESSURE: 84 MMHG | SYSTOLIC BLOOD PRESSURE: 124 MMHG

## 2021-07-07 DIAGNOSIS — J45.991 COUGH VARIANT ASTHMA: ICD-10-CM

## 2021-07-07 DIAGNOSIS — J30.89 NON-SEASONAL ALLERGIC RHINITIS DUE TO OTHER ALLERGIC TRIGGER: ICD-10-CM

## 2021-07-07 DIAGNOSIS — R05.9 COUGH: ICD-10-CM

## 2021-07-07 PROCEDURE — 99214 PR OFFICE/OUTPT VISIT, EST, LEVL IV, 30-39 MIN: ICD-10-PCS | Mod: 25,S$PBB,, | Performed by: INTERNAL MEDICINE

## 2021-07-07 PROCEDURE — 99999 PR PBB SHADOW E&M-EST. PATIENT-LVL II: CPT | Mod: PBBFAC,,, | Performed by: INTERNAL MEDICINE

## 2021-07-07 PROCEDURE — 94010 BREATHING CAPACITY TEST: CPT | Mod: 26,S$PBB,, | Performed by: INTERNAL MEDICINE

## 2021-07-07 PROCEDURE — 94010 BREATHING CAPACITY TEST: CPT | Mod: PBBFAC

## 2021-07-07 PROCEDURE — 94010 BREATHING CAPACITY TEST: ICD-10-PCS | Mod: 26,S$PBB,, | Performed by: INTERNAL MEDICINE

## 2021-07-07 PROCEDURE — 99212 OFFICE O/P EST SF 10 MIN: CPT | Mod: PBBFAC,25 | Performed by: INTERNAL MEDICINE

## 2021-07-07 PROCEDURE — 99999 PR PBB SHADOW E&M-EST. PATIENT-LVL II: ICD-10-PCS | Mod: PBBFAC,,, | Performed by: INTERNAL MEDICINE

## 2021-07-07 PROCEDURE — 99214 OFFICE O/P EST MOD 30 MIN: CPT | Mod: 25,S$PBB,, | Performed by: INTERNAL MEDICINE

## 2021-07-07 RX ORDER — FLUTICASONE PROPIONATE 50 MCG
2 SPRAY, SUSPENSION (ML) NASAL DAILY
Qty: 48 ML | Refills: 3 | Status: SHIPPED | OUTPATIENT
Start: 2021-07-07 | End: 2022-02-07

## 2021-07-07 RX ORDER — FLUTICASONE PROPIONATE AND SALMETEROL XINAFOATE 115; 21 UG/1; UG/1
2 AEROSOL, METERED RESPIRATORY (INHALATION) 2 TIMES DAILY
Qty: 1 INHALER | Refills: 11 | Status: SHIPPED | OUTPATIENT
Start: 2021-07-07 | End: 2022-02-07

## 2021-07-08 LAB
BRPFT: ABNORMAL
FEF 25 75 LLN: 1.13
FEF 25 75 PRE REF: 32.6 %
FEF 25 75 REF: 2.53
FEV1 FVC LLN: 70
FEV1 FVC PRE REF: 91 %
FEV1 FVC REF: 81
FEV1 LLN: 1.27
FEV1 PRE REF: 61.7 %
FEV1 REF: 1.79
FVC LLN: 1.6
FVC PRE REF: 67.6 %
FVC REF: 2.21
PEF LLN: 3.84
PEF PRE REF: 60.5 %
PEF REF: 5.32
PRE FEF 25 75: 0.82 L/S (ref 1.13–3.93)
PRE FET 100: 6.3 SEC
PRE FEV1 FVC: 73.83 % (ref 69.67–92.52)
PRE FEV1: 1.1 L (ref 1.27–2.3)
PRE FVC: 1.49 L (ref 1.6–2.82)
PRE PEF: 3.22 L/S (ref 3.84–6.8)

## 2022-02-03 ENCOUNTER — TELEPHONE (OUTPATIENT)
Dept: PULMONOLOGY | Facility: CLINIC | Age: 67
End: 2022-02-03
Payer: MEDICAID

## 2022-02-04 ENCOUNTER — TELEPHONE (OUTPATIENT)
Dept: PULMONOLOGY | Facility: CLINIC | Age: 67
End: 2022-02-04
Payer: MEDICAID

## 2022-02-07 ENCOUNTER — CLINICAL SUPPORT (OUTPATIENT)
Dept: PULMONOLOGY | Facility: CLINIC | Age: 67
End: 2022-02-07
Payer: MEDICARE

## 2022-02-07 ENCOUNTER — HOSPITAL ENCOUNTER (OUTPATIENT)
Dept: RADIOLOGY | Facility: HOSPITAL | Age: 67
Discharge: HOME | End: 2022-02-07
Attending: INTERNAL MEDICINE
Payer: MEDICARE

## 2022-02-07 ENCOUNTER — OFFICE VISIT (OUTPATIENT)
Dept: PULMONOLOGY | Facility: CLINIC | Age: 67
End: 2022-02-07
Payer: MEDICARE

## 2022-02-07 VITALS
HEIGHT: 61 IN | HEART RATE: 86 BPM | BODY MASS INDEX: 20.65 KG/M2 | OXYGEN SATURATION: 95 % | WEIGHT: 109.38 LBS | DIASTOLIC BLOOD PRESSURE: 60 MMHG | RESPIRATION RATE: 10 BRPM | SYSTOLIC BLOOD PRESSURE: 118 MMHG

## 2022-02-07 DIAGNOSIS — J45.991 COUGH VARIANT ASTHMA: ICD-10-CM

## 2022-02-07 DIAGNOSIS — R05.9 COUGH: ICD-10-CM

## 2022-02-07 DIAGNOSIS — J30.89 NON-SEASONAL ALLERGIC RHINITIS DUE TO OTHER ALLERGIC TRIGGER: ICD-10-CM

## 2022-02-07 DIAGNOSIS — Z01.818 PRE-PROCEDURAL EXAMINATION: Primary | ICD-10-CM

## 2022-02-07 DIAGNOSIS — J45.991 COUGH VARIANT ASTHMA: Primary | ICD-10-CM

## 2022-02-07 LAB
BRPFT: NORMAL
CTP QC/QA: YES
FEF 25 75 LLN: 1.16
FEF 25 75 PRE REF: 42.1 %
FEF 25 75 REF: 2.56
FEV1 FVC LLN: 69
FEV1 FVC PRE REF: 96 %
FEV1 FVC REF: 81
FEV1 LLN: 1.3
FEV1 PRE REF: 65 %
FEV1 REF: 1.82
FVC LLN: 1.63
FVC PRE REF: 67.4 %
FVC REF: 2.26
PEF LLN: 3.95
PEF PRE REF: 70.2 %
PEF REF: 5.43
PRE FEF 25 75: 1.08 L/S
PRE FET 100: 7.02 SEC
PRE FEV1 FVC: 77.64 %
PRE FEV1: 1.18 L
PRE FVC: 1.52 L
PRE PEF: 3.81 L/S
SARS-COV-2 AG RESP QL IA.RAPID: NEGATIVE

## 2022-02-07 PROCEDURE — 3078F DIAST BP <80 MM HG: CPT | Mod: CPTII,S$GLB,, | Performed by: NURSE PRACTITIONER

## 2022-02-07 PROCEDURE — 1101F PR PT FALLS ASSESS DOC 0-1 FALLS W/OUT INJ PAST YR: ICD-10-PCS | Mod: CPTII,S$GLB,, | Performed by: NURSE PRACTITIONER

## 2022-02-07 PROCEDURE — 87811 SARS CORONAVIRUS 2 ANTIGEN POCT, MANUAL READ: ICD-10-PCS | Mod: S$GLB,,, | Performed by: INTERNAL MEDICINE

## 2022-02-07 PROCEDURE — 1159F PR MEDICATION LIST DOCUMENTED IN MEDICAL RECORD: ICD-10-PCS | Mod: CPTII,S$GLB,, | Performed by: NURSE PRACTITIONER

## 2022-02-07 PROCEDURE — 3074F PR MOST RECENT SYSTOLIC BLOOD PRESSURE < 130 MM HG: ICD-10-PCS | Mod: CPTII,S$GLB,, | Performed by: NURSE PRACTITIONER

## 2022-02-07 PROCEDURE — 99214 OFFICE O/P EST MOD 30 MIN: CPT | Mod: S$GLB,,, | Performed by: NURSE PRACTITIONER

## 2022-02-07 PROCEDURE — 71046 X-RAY EXAM CHEST 2 VIEWS: CPT | Mod: 26,,, | Performed by: RADIOLOGY

## 2022-02-07 PROCEDURE — 1160F PR REVIEW ALL MEDS BY PRESCRIBER/CLIN PHARMACIST DOCUMENTED: ICD-10-PCS | Mod: CPTII,S$GLB,, | Performed by: NURSE PRACTITIONER

## 2022-02-07 PROCEDURE — 1101F PT FALLS ASSESS-DOCD LE1/YR: CPT | Mod: CPTII,S$GLB,, | Performed by: NURSE PRACTITIONER

## 2022-02-07 PROCEDURE — 99999 PR PBB SHADOW E&M-EST. PATIENT-LVL III: ICD-10-PCS | Mod: PBBFAC,,, | Performed by: NURSE PRACTITIONER

## 2022-02-07 PROCEDURE — 3008F PR BODY MASS INDEX (BMI) DOCUMENTED: ICD-10-PCS | Mod: CPTII,S$GLB,, | Performed by: NURSE PRACTITIONER

## 2022-02-07 PROCEDURE — 99999 PR PBB SHADOW E&M-EST. PATIENT-LVL III: CPT | Mod: PBBFAC,,, | Performed by: NURSE PRACTITIONER

## 2022-02-07 PROCEDURE — 3288F FALL RISK ASSESSMENT DOCD: CPT | Mod: CPTII,S$GLB,, | Performed by: NURSE PRACTITIONER

## 2022-02-07 PROCEDURE — 87811 SARS-COV-2 COVID19 W/OPTIC: CPT | Mod: S$GLB,,, | Performed by: INTERNAL MEDICINE

## 2022-02-07 PROCEDURE — 3288F PR FALLS RISK ASSESSMENT DOCUMENTED: ICD-10-PCS | Mod: CPTII,S$GLB,, | Performed by: NURSE PRACTITIONER

## 2022-02-07 PROCEDURE — 3008F BODY MASS INDEX DOCD: CPT | Mod: CPTII,S$GLB,, | Performed by: NURSE PRACTITIONER

## 2022-02-07 PROCEDURE — 94010 BREATHING CAPACITY TEST: CPT | Mod: S$GLB,,, | Performed by: INTERNAL MEDICINE

## 2022-02-07 PROCEDURE — 99214 PR OFFICE/OUTPT VISIT, EST, LEVL IV, 30-39 MIN: ICD-10-PCS | Mod: S$GLB,,, | Performed by: NURSE PRACTITIONER

## 2022-02-07 PROCEDURE — 1159F MED LIST DOCD IN RCRD: CPT | Mod: CPTII,S$GLB,, | Performed by: NURSE PRACTITIONER

## 2022-02-07 PROCEDURE — 94010 BREATHING CAPACITY TEST: ICD-10-PCS | Mod: S$GLB,,, | Performed by: INTERNAL MEDICINE

## 2022-02-07 PROCEDURE — 71046 X-RAY EXAM CHEST 2 VIEWS: CPT | Mod: TC

## 2022-02-07 PROCEDURE — 1160F RVW MEDS BY RX/DR IN RCRD: CPT | Mod: CPTII,S$GLB,, | Performed by: NURSE PRACTITIONER

## 2022-02-07 PROCEDURE — 3078F PR MOST RECENT DIASTOLIC BLOOD PRESSURE < 80 MM HG: ICD-10-PCS | Mod: CPTII,S$GLB,, | Performed by: NURSE PRACTITIONER

## 2022-02-07 PROCEDURE — 3074F SYST BP LT 130 MM HG: CPT | Mod: CPTII,S$GLB,, | Performed by: NURSE PRACTITIONER

## 2022-02-07 PROCEDURE — 71046 XR CHEST PA AND LATERAL: ICD-10-PCS | Mod: 26,,, | Performed by: RADIOLOGY

## 2022-02-07 NOTE — PROGRESS NOTES
"Subjective:      Patient ID: Christa Lindo is a 66 y.o. female.    Chief Complaint: Asthma    HPI  Presents for follow up for cough. She is no longer taking medications for rhinitis or cough variant asthma. Symptoms have resolved.  No fever, chills, or hemoptysis. No pleuritic type chest pain. Breathing is stable as compared to 6 months ago.      There is no problem list on file for this patient.      /60   Pulse 86   Resp 10   Ht 5' 1" (1.549 m)   Wt 49.6 kg (109 lb 5.6 oz)   SpO2 95%   BMI 20.66 kg/m²   Body mass index is 20.66 kg/m².    Review of Systems   Constitutional: Negative.    HENT: Negative.    Respiratory: Negative.    Cardiovascular: Negative.    Musculoskeletal: Negative.    Gastrointestinal: Negative.    Neurological: Negative.    Psychiatric/Behavioral: Negative.      Objective:      Physical Exam  Constitutional:       Appearance: Normal appearance. She is well-developed.   HENT:      Head: Normocephalic and atraumatic.      Mouth/Throat:      Comments: Wearing mask due to covid concerns  Cardiovascular:      Rate and Rhythm: Normal rate and regular rhythm.      Heart sounds: No murmur heard.  No gallop.    Pulmonary:      Effort: Pulmonary effort is normal.      Breath sounds: Normal breath sounds.   Abdominal:      Palpations: Abdomen is soft. There is no mass.      Tenderness: There is no abdominal tenderness.   Musculoskeletal:         General: Normal range of motion.      Cervical back: Normal range of motion and neck supple.   Skin:     General: Skin is warm and dry.   Neurological:      Mental Status: She is alert and oriented to person, place, and time.   Psychiatric:         Mood and Affect: Mood normal.         Behavior: Behavior normal.       Personal Diagnostic Review  Results for orders placed during the hospital encounter of 02/07/22    X-Ray Chest PA And Lateral    Narrative  EXAMINATION:  XR CHEST PA AND LATERAL    CLINICAL HISTORY:  SOB; Cough variant " asthma    TECHNIQUE:  PA and lateral views of the chest were performed.    COMPARISON:  Chest x-ray 08/13/2020.  Chest CT from 09/17/2020    FINDINGS:  Bilateral breast prostheses noted.  No focal consolidation or effusion.  Stable appearance of the cardiomediastinal silhouette with mild dilation of the ascending aorta, not significantly changed.  Osteopenia and degenerative changes of thoracic spine and shoulders.    Impression  As above      Electronically signed by: Job Sanchez MD  Date:    02/07/2022  Time:    09:01        Assessment:       1. Cough variant asthma    2. Non-seasonal allergic rhinitis due to other allergic trigger    3. Cough        Outpatient Encounter Medications as of 2/7/2022   Medication Sig Dispense Refill    cephALEXin (KEFLEX) 500 MG capsule TAKE 1 CAPSULE BY MOUTH TWICE A DAY FOR 14 DAYS      fish oil-omega-3 fatty acids 300-1,000 mg capsule Take 2 g by mouth once daily.      HYDROcodone-acetaminophen (NORCO) 7.5-325 mg per tablet Take 1 tablet by mouth every 6 (six) hours as needed.      ondansetron (ZOFRAN-ODT) 4 MG TbDL DISSOLVE ONE TABLET IN MOUTH EVERY 6 HOURS AS NEEDED FOR NAUSEA      [DISCONTINUED] benzonatate (TESSALON) 100 MG capsule Take 1 capsule (100 mg total) by mouth 3 (three) times daily as needed for Cough. (Patient not taking: Reported on 2/7/2022) 30 capsule 0    [DISCONTINUED] cetirizine (ZYRTEC) 10 MG tablet TAKE 1 TABLET (10 MG TOTAL) BY MOUTH ONCE DAILY FOR SINUS CONGESTION (Patient not taking: Reported on 2/7/2022) 90 tablet 3    [DISCONTINUED] fluticasone propion-salmeterol 115-21 mcg/dose (ADVAIR HFA) 115-21 mcg/actuation HFAA inhaler Inhale 2 puffs into the lungs 2 (two) times daily. Wash out mouth after use. Controller (Patient not taking: Reported on 2/7/2022) 1 Inhaler 11    [DISCONTINUED] fluticasone propionate (FLONASE) 50 mcg/actuation nasal spray 2 sprays (100 mcg total) by Each Nostril route once daily. (Patient not taking: Reported on  2/7/2022) 48 mL 3    [DISCONTINUED] predniSONE (DELTASONE) 20 MG tablet Prednisone 60 mg/ day for 3 days, 40 mg/day for 3 days,20 mg/ day for 3 days, (1/2 tablet )10 mg a day for 3 days. (Patient not taking: Reported on 2/7/2022) 20 tablet 0     No facility-administered encounter medications on file as of 2/7/2022.     No orders of the defined types were placed in this encounter.    Plan:      doing well. Cough is subacute. Follow up if needed.     Problem List Items Addressed This Visit    None     Visit Diagnoses     Cough variant asthma    -  Primary    Non-seasonal allergic rhinitis due to other allergic trigger        Cough

## 2024-04-03 ENCOUNTER — PATIENT MESSAGE (OUTPATIENT)
Dept: PULMONOLOGY | Facility: CLINIC | Age: 69
End: 2024-04-03